# Patient Record
Sex: MALE | Race: WHITE | Employment: OTHER | URBAN - METROPOLITAN AREA
[De-identification: names, ages, dates, MRNs, and addresses within clinical notes are randomized per-mention and may not be internally consistent; named-entity substitution may affect disease eponyms.]

---

## 2017-06-20 PROBLEM — N52.1 ERECTILE DYSFUNCTION DUE TO DISEASES CLASSIFIED ELSEWHERE: Status: ACTIVE | Noted: 2017-06-20

## 2017-06-20 PROBLEM — N32.89 BLADDER MASS: Status: ACTIVE | Noted: 2017-06-20

## 2017-06-20 PROCEDURE — 88108 CYTOPATH CONCENTRATE TECH: CPT | Performed by: UROLOGY

## 2017-06-21 PROBLEM — I48.4 ATYPICAL ATRIAL FLUTTER (HCC): Status: ACTIVE | Noted: 2017-06-21

## 2017-07-12 PROBLEM — I25.10 CORONARY ARTERY DISEASE INVOLVING NATIVE CORONARY ARTERY OF NATIVE HEART WITHOUT ANGINA PECTORIS: Status: ACTIVE | Noted: 2017-07-12

## 2017-07-12 PROBLEM — Z95.1 HX OF CABG: Status: ACTIVE | Noted: 2017-07-12

## 2017-08-18 PROBLEM — I42.9 CARDIOMYOPATHY, UNSPECIFIED TYPE (HCC): Status: ACTIVE | Noted: 2017-08-18

## 2017-09-11 PROCEDURE — 88108 CYTOPATH CONCENTRATE TECH: CPT | Performed by: PHYSICIAN ASSISTANT

## 2017-09-11 PROCEDURE — 84154 ASSAY OF PSA FREE: CPT | Performed by: UROLOGY

## 2017-09-11 PROCEDURE — 84153 ASSAY OF PSA TOTAL: CPT | Performed by: UROLOGY

## 2017-09-11 PROCEDURE — 87086 URINE CULTURE/COLONY COUNT: CPT | Performed by: PHYSICIAN ASSISTANT

## 2018-05-22 PROBLEM — Z98.890 HISTORY OF CARDIOVERSION: Status: ACTIVE | Noted: 2018-05-22

## 2018-05-22 PROBLEM — Z92.89 HISTORY OF CARDIOVERSION: Status: ACTIVE | Noted: 2018-05-22

## 2018-05-22 PROBLEM — Z79.01 ANTICOAGULATED ON COUMADIN: Status: ACTIVE | Noted: 2018-05-22

## 2018-05-22 PROBLEM — I10 ESSENTIAL HYPERTENSION: Status: ACTIVE | Noted: 2018-05-22

## 2018-06-14 PROBLEM — Z95.0 PACEMAKER: Status: ACTIVE | Noted: 2018-06-14

## 2018-10-12 PROBLEM — Z86.79 H/O CARDIOMYOPATHY: Status: ACTIVE | Noted: 2017-01-12

## 2018-10-12 PROBLEM — I42.9 CARDIOMYOPATHY, UNSPECIFIED TYPE (HCC): Status: RESOLVED | Noted: 2017-08-18 | Resolved: 2018-10-12

## 2018-12-04 PROBLEM — I48.0 PAROXYSMAL ATRIAL FIBRILLATION (HCC): Chronic | Status: ACTIVE | Noted: 2018-12-04

## 2018-12-11 ENCOUNTER — HOSPITAL ENCOUNTER (OUTPATIENT)
Dept: CT IMAGING | Facility: HOSPITAL | Age: 83
Discharge: HOME | End: 2018-12-11
Attending: NURSE PRACTITIONER
Payer: MEDICARE

## 2018-12-11 VITALS
SYSTOLIC BLOOD PRESSURE: 148 MMHG | RESPIRATION RATE: 18 BRPM | TEMPERATURE: 97 F | OXYGEN SATURATION: 99 % | HEART RATE: 62 BPM | DIASTOLIC BLOOD PRESSURE: 66 MMHG

## 2018-12-11 DIAGNOSIS — I48.0 PAROXYSMAL ATRIAL FIBRILLATION (HCC): Chronic | ICD-10-CM

## 2018-12-11 PROCEDURE — 75572 CT HRT W/3D IMAGE: CPT | Performed by: NURSE PRACTITIONER

## 2018-12-11 PROCEDURE — 82565 ASSAY OF CREATININE: CPT

## 2018-12-11 PROCEDURE — 96360 HYDRATION IV INFUSION INIT: CPT | Performed by: NURSE PRACTITIONER

## 2018-12-11 RX ORDER — SODIUM CHLORIDE 9 MG/ML
INJECTION, SOLUTION INTRAVENOUS CONTINUOUS
Status: DISCONTINUED | OUTPATIENT
Start: 2018-12-11 | End: 2018-12-13

## 2018-12-11 RX ADMIN — SODIUM CHLORIDE: 9 INJECTION, SOLUTION INTRAVENOUS at 07:15:00

## 2018-12-14 NOTE — PROGRESS NOTES
Dear  Mr. Lopez Comment,    This test was done to help plan your Watchman procedure. There are no abnormalities. Please feel free to call or email for any concerns or questions.     Thank you  Melonie Dubin APN, CNP  [phone]

## 2018-12-14 NOTE — PROGRESS NOTES
Dear  Mr. Shwetha Ibanez,    This test was done to help plan your Watchman procedure. There are no abnormalities. Please feel free to call or email for any concerns or questions.     Thank you  Amanda CHENG, CNP  [phone]

## 2019-01-02 ENCOUNTER — HOSPITAL (OUTPATIENT)
Dept: OTHER | Age: 84
End: 2019-01-02
Attending: INTERNAL MEDICINE

## 2019-01-02 LAB
ANALYZER ANC (IANC): ABNORMAL
ANION GAP SERPL CALC-SCNC: 14 MMOL/L (ref 10–20)
BUN SERPL-MCNC: 18 MG/DL (ref 6–20)
BUN/CREAT SERPL: 17 (ref 7–25)
CALCIUM SERPL-MCNC: 9.5 MG/DL (ref 8.4–10.2)
CHLORIDE: 103 MMOL/L (ref 98–107)
CO2 SERPL-SCNC: 29 MMOL/L (ref 21–32)
CREAT SERPL-MCNC: 1.05 MG/DL (ref 0.67–1.17)
ERYTHROCYTE [DISTWIDTH] IN BLOOD: 13.4 % (ref 11–15)
GLUCOSE SERPL-MCNC: 109 MG/DL (ref 65–99)
HEMATOCRIT: 39.6 % (ref 39–51)
HGB BLD-MCNC: 13.3 GM/DL (ref 13–17)
MCH RBC QN AUTO: 32.9 PG (ref 26–34)
MCHC RBC AUTO-ENTMCNC: 33.6 GM/DL (ref 32–36.5)
MCV RBC AUTO: 98 FL (ref 78–100)
NRBC (NRBCRE): 0 /100 WBC
PLATELET # BLD: 125 THOUSAND/MCL (ref 140–450)
POTASSIUM SERPL-SCNC: 4.7 MMOL/L (ref 3.4–5.1)
RBC # BLD: 4.04 MILLION/MCL (ref 4.5–5.9)
SODIUM SERPL-SCNC: 141 MMOL/L (ref 135–145)
WBC # BLD: 3.3 THOUSAND/MCL (ref 4.2–11)

## 2019-01-09 ENCOUNTER — HOSPITAL (OUTPATIENT)
Dept: OTHER | Age: 84
End: 2019-01-09
Attending: HOSPITALIST

## 2019-01-09 LAB
GLUCOSE BLDC GLUCOMTR-MCNC: 102 MG/DL (ref 65–99)
INR PPP: 1.3
PROTHROMBIN TIME: 12.7 SECONDS (ref 9.7–11.8)
PROTHROMBIN TIME: ABNORMAL

## 2019-01-10 LAB
ANALYZER ANC (IANC): ABNORMAL
ANION GAP SERPL CALC-SCNC: 11 MMOL/L (ref 10–20)
BUN SERPL-MCNC: 19 MG/DL (ref 6–20)
BUN/CREAT SERPL: 16 (ref 7–25)
CALCIUM SERPL-MCNC: 8.6 MG/DL (ref 8.4–10.2)
CHLORIDE: 104 MMOL/L (ref 98–107)
CO2 SERPL-SCNC: 29 MMOL/L (ref 21–32)
CREAT SERPL-MCNC: 1.16 MG/DL (ref 0.67–1.17)
ERYTHROCYTE [DISTWIDTH] IN BLOOD: 13.4 % (ref 11–15)
GLUCOSE SERPL-MCNC: 104 MG/DL (ref 65–99)
HEMATOCRIT: 36 % (ref 39–51)
HGB BLD-MCNC: 12.1 GM/DL (ref 13–17)
INR PPP: 1.1
MCH RBC QN AUTO: 33 PG (ref 26–34)
MCHC RBC AUTO-ENTMCNC: 33.6 GM/DL (ref 32–36.5)
MCV RBC AUTO: 98.1 FL (ref 78–100)
NRBC (NRBCRE): 0 /100 WBC
PLATELET # BLD: 111 THOUSAND/MCL (ref 140–450)
POTASSIUM SERPL-SCNC: 4.4 MMOL/L (ref 3.4–5.1)
PROTHROMBIN TIME: 11.6 SECONDS (ref 9.7–11.8)
PROTHROMBIN TIME: NORMAL
RBC # BLD: 3.67 MILLION/MCL (ref 4.5–5.9)
SODIUM SERPL-SCNC: 140 MMOL/L (ref 135–145)
WBC # BLD: 5.1 THOUSAND/MCL (ref 4.2–11)

## 2019-01-16 PROBLEM — I48.4 ATYPICAL ATRIAL FLUTTER (HCC): Status: RESOLVED | Noted: 2017-06-21 | Resolved: 2019-01-14

## 2019-01-16 PROCEDURE — 88108 CYTOPATH CONCENTRATE TECH: CPT | Performed by: PHYSICIAN ASSISTANT

## 2019-01-29 PROBLEM — Z53.09 MEDICAL CONTRAINDICATION TO ANTICOAGULANT MEDICATION: Status: ACTIVE | Noted: 2019-01-29

## 2019-01-29 PROBLEM — Z95.9 CARDIAC DEVICE IN SITU: Status: ACTIVE | Noted: 2019-01-09

## 2019-02-27 ENCOUNTER — HOSPITAL (OUTPATIENT)
Dept: OTHER | Age: 84
End: 2019-02-27
Attending: INTERNAL MEDICINE

## 2019-02-27 LAB
ANALYZER ANC (IANC): ABNORMAL
ANION GAP SERPL CALC-SCNC: 12 MMOL/L (ref 10–20)
BASOPHILS # BLD: 0 THOUSAND/MCL (ref 0–0.3)
BASOPHILS NFR BLD: 0 %
BUN SERPL-MCNC: 17 MG/DL (ref 6–20)
BUN/CREAT SERPL: 18 (ref 7–25)
CALCIUM SERPL-MCNC: 8.4 MG/DL (ref 8.4–10.2)
CHLORIDE: 106 MMOL/L (ref 98–107)
CO2 SERPL-SCNC: 26 MMOL/L (ref 21–32)
CREAT SERPL-MCNC: 0.93 MG/DL (ref 0.67–1.17)
DIFFERENTIAL METHOD BLD: ABNORMAL
EOSINOPHIL # BLD: 0.1 THOUSAND/MCL (ref 0.1–0.5)
EOSINOPHIL NFR BLD: 3 %
ERYTHROCYTE [DISTWIDTH] IN BLOOD: 13.4 % (ref 11–15)
GLUCOSE SERPL-MCNC: 104 MG/DL (ref 65–99)
HEMATOCRIT: 35 % (ref 39–51)
HGB BLD-MCNC: 12 GM/DL (ref 13–17)
IMM GRANULOCYTES # BLD AUTO: 0 THOUSAND/MCL (ref 0–0.2)
IMM GRANULOCYTES NFR BLD: 0 %
LYMPHOCYTES # BLD: 1.1 THOUSAND/MCL (ref 1–4)
LYMPHOCYTES NFR BLD: 34 %
MCH RBC QN AUTO: 33.4 PG (ref 26–34)
MCHC RBC AUTO-ENTMCNC: 34.3 GM/DL (ref 32–36.5)
MCV RBC AUTO: 97.5 FL (ref 78–100)
MONOCYTES # BLD: 0.4 THOUSAND/MCL (ref 0.3–0.9)
MONOCYTES NFR BLD: 13 %
NEUTROPHILS # BLD: 1.5 THOUSAND/MCL (ref 1.8–7.7)
NEUTROPHILS NFR BLD: 50 %
NEUTS SEG NFR BLD: ABNORMAL %
NRBC (NRBCRE): 0 /100 WBC
PLATELET # BLD: 125 THOUSAND/MCL (ref 140–450)
POTASSIUM SERPL-SCNC: 4 MMOL/L (ref 3.4–5.1)
RBC # BLD: 3.59 MILLION/MCL (ref 4.5–5.9)
SODIUM SERPL-SCNC: 140 MMOL/L (ref 135–145)
WBC # BLD: 3.1 THOUSAND/MCL (ref 4.2–11)

## 2019-02-28 PROBLEM — I48.0 PAROXYSMAL ATRIAL FIBRILLATION (HCC): Chronic | Status: RESOLVED | Noted: 2018-12-04 | Resolved: 2019-02-28

## 2019-11-13 PROBLEM — Z79.01 ANTICOAGULATED ON COUMADIN: Status: RESOLVED | Noted: 2018-05-22 | Resolved: 2019-11-13

## 2019-11-13 PROBLEM — Z92.89 HISTORY OF CARDIOVERSION: Status: RESOLVED | Noted: 2018-05-22 | Resolved: 2019-11-13

## 2019-11-13 PROBLEM — Z95.1 HX OF CABG: Status: RESOLVED | Noted: 2017-07-12 | Resolved: 2019-11-13

## 2019-11-13 PROBLEM — Z98.890 HISTORY OF CARDIOVERSION: Status: RESOLVED | Noted: 2018-05-22 | Resolved: 2019-11-13

## 2019-11-14 PROBLEM — N32.89 BLADDER MASS: Status: RESOLVED | Noted: 2017-06-20 | Resolved: 2019-11-14

## 2019-11-14 PROBLEM — D69.6 THROMBOCYTOPENIA (HCC): Status: ACTIVE | Noted: 2019-11-14

## 2019-11-14 PROBLEM — D69.6 THROMBOCYTOPENIA: Status: ACTIVE | Noted: 2019-11-14

## 2019-11-14 PROBLEM — R73.01 ELEVATED FASTING BLOOD SUGAR: Status: ACTIVE | Noted: 2019-11-14

## 2020-02-13 PROBLEM — Z87.438 HISTORY OF BPH: Status: ACTIVE | Noted: 2020-02-13

## 2020-02-13 PROBLEM — I48.92 ATRIAL FLUTTER (CMD): Status: ACTIVE | Noted: 2020-02-13

## 2020-02-13 PROBLEM — I25.10 CORONARY ARTERY DISEASE INVOLVING NATIVE CORONARY ARTERY OF NATIVE HEART: Status: ACTIVE | Noted: 2020-02-13

## 2020-02-13 PROBLEM — I48.91 A-FIB (CMD): Status: ACTIVE | Noted: 2020-02-13

## 2020-02-13 PROBLEM — Z95.1 HX OF CABG: Status: ACTIVE | Noted: 2020-02-13

## 2020-02-13 PROBLEM — Z95.0 PACEMAKER: Status: ACTIVE | Noted: 2020-02-13

## 2020-02-13 PROBLEM — I10 HTN (HYPERTENSION): Status: ACTIVE | Noted: 2020-02-13

## 2020-02-13 PROBLEM — Z91.89 RISK FACTORS FOR OBSTRUCTIVE SLEEP APNEA: Status: ACTIVE | Noted: 2020-02-13

## 2020-02-13 RX ORDER — AMIODARONE HYDROCHLORIDE 100 MG/1
100 TABLET ORAL DAILY
COMMUNITY

## 2020-02-13 RX ORDER — FINASTERIDE 5 MG/1
5 TABLET, FILM COATED ORAL EVERY EVENING
COMMUNITY

## 2020-02-13 RX ORDER — TETRACYCLINE HCL 500 MG
1 CAPSULE ORAL DAILY
COMMUNITY

## 2020-02-13 RX ORDER — CARVEDILOL 6.25 MG/1
6.25 TABLET ORAL 2 TIMES DAILY WITH MEALS
COMMUNITY

## 2020-02-13 RX ORDER — TAMSULOSIN HYDROCHLORIDE 0.4 MG/1
0.4 CAPSULE ORAL EVERY EVENING
COMMUNITY

## 2020-02-13 RX ORDER — OLMESARTAN MEDOXOMIL 40 MG/1
40 TABLET ORAL DAILY
COMMUNITY

## 2020-02-14 ENCOUNTER — HOSPITAL ENCOUNTER (OUTPATIENT)
Dept: CARDIOLOGY | Age: 85
Discharge: HOME | End: 2020-02-14
Attending: INTERNAL MEDICINE

## 2020-02-14 VITALS
BODY MASS INDEX: 30.2 KG/M2 | DIASTOLIC BLOOD PRESSURE: 88 MMHG | OXYGEN SATURATION: 92 % | WEIGHT: 210.98 LBS | TEMPERATURE: 96.8 F | HEIGHT: 70 IN | RESPIRATION RATE: 13 BRPM | SYSTOLIC BLOOD PRESSURE: 170 MMHG | HEART RATE: 62 BPM

## 2020-02-14 DIAGNOSIS — I48.0 PAF (PAROXYSMAL ATRIAL FIBRILLATION) (CMD): ICD-10-CM

## 2020-02-14 PROCEDURE — 10002807 HB RX 258

## 2020-02-14 PROCEDURE — 13000001 HB PHASE II RECOVERY EA 30 MINUTES

## 2020-02-14 PROCEDURE — 99152 MOD SED SAME PHYS/QHP 5/>YRS: CPT

## 2020-02-14 PROCEDURE — 93325 DOPPLER ECHO COLOR FLOW MAPG: CPT

## 2020-02-14 PROCEDURE — 10002800 HB RX 250 W HCPCS: Performed by: INTERNAL MEDICINE

## 2020-02-14 RX ORDER — MIDAZOLAM HYDROCHLORIDE 2 MG/2ML
INJECTION, SOLUTION INTRAMUSCULAR; INTRAVENOUS
Status: DISPENSED
Start: 2020-02-14 | End: 2020-02-14

## 2020-02-14 RX ORDER — MIDAZOLAM HYDROCHLORIDE 1 MG/ML
INJECTION, SOLUTION INTRAMUSCULAR; INTRAVENOUS PRN
Status: COMPLETED | OUTPATIENT
Start: 2020-02-14 | End: 2020-02-14

## 2020-02-14 RX ORDER — NALOXONE HCL 0.4 MG/ML
VIAL (ML) INJECTION
Status: DISCONTINUED
Start: 2020-02-14 | End: 2020-02-14 | Stop reason: WASHOUT

## 2020-02-14 RX ORDER — SODIUM CHLORIDE 9 MG/ML
INJECTION, SOLUTION INTRAVENOUS
Status: COMPLETED
Start: 2020-02-14 | End: 2020-02-14

## 2020-02-14 RX ORDER — SODIUM CHLORIDE 9 MG/ML
INJECTION, SOLUTION INTRAVENOUS CONTINUOUS
Status: DISCONTINUED | OUTPATIENT
Start: 2020-02-14 | End: 2020-02-15 | Stop reason: HOSPADM

## 2020-02-14 RX ORDER — FLUMAZENIL 0.1 MG/ML
INJECTION, SOLUTION INTRAVENOUS
Status: DISCONTINUED
Start: 2020-02-14 | End: 2020-02-14 | Stop reason: WASHOUT

## 2020-02-14 RX ADMIN — MIDAZOLAM HYDROCHLORIDE 2 MG: 1 INJECTION, SOLUTION INTRAMUSCULAR; INTRAVENOUS at 08:44

## 2020-02-14 RX ADMIN — SODIUM CHLORIDE: 9 INJECTION, SOLUTION INTRAVENOUS at 08:34

## 2020-02-14 RX ADMIN — SODIUM CHLORIDE: 0.9 INJECTION, SOLUTION INTRAVENOUS at 08:34

## 2020-02-14 RX ADMIN — MIDAZOLAM HYDROCHLORIDE 2 MG: 1 INJECTION, SOLUTION INTRAMUSCULAR; INTRAVENOUS at 08:49

## 2020-02-14 RX ADMIN — FENTANYL CITRATE 50 MCG: 50 INJECTION INTRAMUSCULAR; INTRAVENOUS at 08:44

## 2020-02-14 RX ADMIN — MIDAZOLAM HYDROCHLORIDE 2 MG: 1 INJECTION, SOLUTION INTRAMUSCULAR; INTRAVENOUS at 08:46

## 2020-02-14 ASSESSMENT — PAIN SCALES - GENERAL
PAINLEVEL_OUTOF10: 0
PAINLEVEL_OUTOF10: 0

## 2022-03-17 ENCOUNTER — OFFICE VISIT (OUTPATIENT)
Dept: SURGERY | Facility: CLINIC | Age: 87
End: 2022-03-17
Payer: MEDICARE

## 2022-03-17 DIAGNOSIS — R82.90 URINE FINDING: ICD-10-CM

## 2022-03-17 DIAGNOSIS — N13.8 BPH WITH OBSTRUCTION/LOWER URINARY TRACT SYMPTOMS: Primary | ICD-10-CM

## 2022-03-17 DIAGNOSIS — N40.1 BPH WITH OBSTRUCTION/LOWER URINARY TRACT SYMPTOMS: Primary | ICD-10-CM

## 2022-03-17 LAB
APPEARANCE: CLEAR
BILIRUBIN: NEGATIVE
GLUCOSE (URINE DIPSTICK): NEGATIVE MG/DL
KETONES (URINE DIPSTICK): NEGATIVE MG/DL
MULTISTIX LOT#: ABNORMAL NUMERIC
NITRITE, URINE: NEGATIVE
PH, URINE: 7 (ref 4.5–8)
PROTEIN (URINE DIPSTICK): 30 MG/DL
SPECIFIC GRAVITY: 1.02 (ref 1–1.03)
URINE-COLOR: YELLOW
UROBILINOGEN,SEMI-QN: 0.2 MG/DL (ref 0–1.9)

## 2022-03-17 PROCEDURE — 81003 URINALYSIS AUTO W/O SCOPE: CPT | Performed by: PHYSICIAN ASSISTANT

## 2022-03-17 PROCEDURE — 99202 OFFICE O/P NEW SF 15 MIN: CPT | Performed by: PHYSICIAN ASSISTANT

## 2022-03-17 RX ORDER — FINASTERIDE 5 MG/1
5 TABLET, FILM COATED ORAL DAILY
Qty: 90 TABLET | Refills: 3 | Status: SHIPPED | OUTPATIENT
Start: 2022-03-17

## 2022-03-17 RX ORDER — TAMSULOSIN HYDROCHLORIDE 0.4 MG/1
0.4 CAPSULE ORAL DAILY
Qty: 30 CAPSULE | Refills: 0 | Status: SHIPPED | OUTPATIENT
Start: 2022-03-17 | End: 2022-04-16

## 2022-03-18 RX ORDER — TAMSULOSIN HYDROCHLORIDE 0.4 MG/1
CAPSULE ORAL
Qty: 90 CAPSULE | Refills: 3 | Status: SHIPPED | OUTPATIENT
Start: 2022-03-18

## 2023-04-03 ENCOUNTER — TELEPHONE (OUTPATIENT)
Dept: SURGERY | Facility: CLINIC | Age: 88
End: 2023-04-03

## 2023-04-05 RX ORDER — FINASTERIDE 5 MG/1
TABLET, FILM COATED ORAL
Qty: 90 TABLET | Refills: 3 | OUTPATIENT
Start: 2023-04-05

## 2023-04-05 NOTE — TELEPHONE ENCOUNTER
Refill request for Finasteride. LOV greater than 1 year ago on 3/17/2022 and no future appt made.  Refill will be denied at this time and [facility] sent

## 2023-04-05 NOTE — TELEPHONE ENCOUNTER
Refill request for Finasteride.  LOV greater than 1 year ago on 3/17/2022 and no future appts made, refill request will be denied at this time and [facility] sent

## 2023-04-10 DIAGNOSIS — N13.8 BPH WITH OBSTRUCTION/LOWER URINARY TRACT SYMPTOMS: ICD-10-CM

## 2023-04-10 DIAGNOSIS — N40.1 BPH WITH OBSTRUCTION/LOWER URINARY TRACT SYMPTOMS: ICD-10-CM

## 2023-04-10 RX ORDER — TAMSULOSIN HYDROCHLORIDE 0.4 MG/1
CAPSULE ORAL
Qty: 90 CAPSULE | Refills: 3 | OUTPATIENT
Start: 2023-04-10

## 2023-04-10 NOTE — TELEPHONE ENCOUNTER
Last OV was on 3/17/2022. Greater than one year and no future appointments scheduled. Medication refill request denied. [employer] message sent to patient.

## 2023-04-14 ENCOUNTER — TELEPHONE (OUTPATIENT)
Dept: SURGERY | Facility: CLINIC | Age: 88
End: 2023-04-14

## 2023-04-14 NOTE — TELEPHONE ENCOUNTER
Pharmacy calling to inform that the patient is at the pharmacy and wants to know why the refill for Tamsulosin was denied.

## 2023-04-19 NOTE — TELEPHONE ENCOUNTER
RN replied to Daniel Amin regarding an inquiry about the denial of Finasteride. Patient was last seen on 3/17/22. No future appt  RN reached out to patient. No answer. Left message as well. Needs to set up yearly appt.

## 2023-04-26 RX ORDER — FINASTERIDE 5 MG/1
TABLET, FILM COATED ORAL
Qty: 30 TABLET | Refills: 0 | Status: SHIPPED | OUTPATIENT
Start: 2023-04-26 | End: 2023-05-26

## 2023-04-26 NOTE — TELEPHONE ENCOUNTER
Pt's last OV was 3/17/22. Pt scheduled a follow up for 5/10/23. Refill sent for 30 days. No additition refills.

## 2023-04-27 DIAGNOSIS — N40.1 BPH WITH OBSTRUCTION/LOWER URINARY TRACT SYMPTOMS: ICD-10-CM

## 2023-04-27 DIAGNOSIS — N13.8 BPH WITH OBSTRUCTION/LOWER URINARY TRACT SYMPTOMS: ICD-10-CM

## 2023-04-28 RX ORDER — TAMSULOSIN HYDROCHLORIDE 0.4 MG/1
CAPSULE ORAL
Qty: 30 CAPSULE | Refills: 0 | Status: SHIPPED | OUTPATIENT
Start: 2023-04-28

## 2023-05-10 ENCOUNTER — OFFICE VISIT (OUTPATIENT)
Dept: SURGERY | Facility: CLINIC | Age: 88
End: 2023-05-10

## 2023-05-10 DIAGNOSIS — R82.90 ABNORMAL URINALYSIS: ICD-10-CM

## 2023-05-10 DIAGNOSIS — R82.90 URINE FINDING: ICD-10-CM

## 2023-05-10 DIAGNOSIS — N40.1 BPH WITH OBSTRUCTION/LOWER URINARY TRACT SYMPTOMS: Primary | ICD-10-CM

## 2023-05-10 DIAGNOSIS — N13.8 BPH WITH OBSTRUCTION/LOWER URINARY TRACT SYMPTOMS: Primary | ICD-10-CM

## 2023-05-10 LAB
APPEARANCE: CLEAR
BILIRUBIN: NEGATIVE
GLUCOSE (URINE DIPSTICK): NEGATIVE MG/DL
HYALINE CASTS #/AREA URNS AUTO: PRESENT /LPF
KETONES (URINE DIPSTICK): NEGATIVE MG/DL
LEUKOCYTES: NEGATIVE
MULTISTIX LOT#: ABNORMAL NUMERIC
NITRITE, URINE: NEGATIVE
PH, URINE: 5.5 (ref 4.5–8)
PROTEIN (URINE DIPSTICK): 100 MG/DL
RBC #/AREA URNS AUTO: >10 /HPF
SPECIFIC GRAVITY: 1.02 (ref 1–1.03)
UROBILINOGEN,SEMI-QN: 0.2 MG/DL (ref 0–1.9)

## 2023-05-10 PROCEDURE — 99213 OFFICE O/P EST LOW 20 MIN: CPT | Performed by: PHYSICIAN ASSISTANT

## 2023-05-10 PROCEDURE — 81003 URINALYSIS AUTO W/O SCOPE: CPT | Performed by: PHYSICIAN ASSISTANT

## 2023-05-10 RX ORDER — FINASTERIDE 5 MG/1
5 TABLET, FILM COATED ORAL DAILY
Qty: 90 TABLET | Refills: 3 | Status: SHIPPED | OUTPATIENT
Start: 2023-05-10 | End: 2023-06-09

## 2023-05-10 RX ORDER — TAMSULOSIN HYDROCHLORIDE 0.4 MG/1
0.4 CAPSULE ORAL DAILY
Qty: 90 CAPSULE | Refills: 3 | Status: SHIPPED | OUTPATIENT
Start: 2023-05-10

## 2023-05-11 DIAGNOSIS — R31.29 MICROHEMATURIA: Primary | ICD-10-CM

## 2024-05-12 DIAGNOSIS — N13.8 BPH WITH OBSTRUCTION/LOWER URINARY TRACT SYMPTOMS: ICD-10-CM

## 2024-05-12 DIAGNOSIS — N40.1 BPH WITH OBSTRUCTION/LOWER URINARY TRACT SYMPTOMS: ICD-10-CM

## 2024-05-14 ENCOUNTER — TELEPHONE (OUTPATIENT)
Dept: SURGERY | Facility: CLINIC | Age: 89
End: 2024-05-14

## 2024-05-14 DIAGNOSIS — N40.1 BPH WITH OBSTRUCTION/LOWER URINARY TRACT SYMPTOMS: ICD-10-CM

## 2024-05-14 DIAGNOSIS — N13.8 BPH WITH OBSTRUCTION/LOWER URINARY TRACT SYMPTOMS: ICD-10-CM

## 2024-05-14 RX ORDER — TAMSULOSIN HYDROCHLORIDE 0.4 MG/1
0.4 CAPSULE ORAL DAILY
Qty: 90 CAPSULE | Refills: 0 | Status: SHIPPED | OUTPATIENT
Start: 2024-05-14

## 2024-05-14 RX ORDER — TAMSULOSIN HYDROCHLORIDE 0.4 MG/1
0.4 CAPSULE ORAL DAILY
Qty: 90 CAPSULE | Refills: 3 | OUTPATIENT
Start: 2024-05-14

## 2024-05-16 RX ORDER — FINASTERIDE 5 MG/1
5 TABLET, FILM COATED ORAL DAILY
Qty: 90 TABLET | Refills: 3 | OUTPATIENT
Start: 2024-05-16

## 2024-05-22 ENCOUNTER — TELEPHONE (OUTPATIENT)
Dept: SURGERY | Facility: CLINIC | Age: 89
End: 2024-05-22

## 2024-05-22 RX ORDER — FINASTERIDE 5 MG/1
5 TABLET, FILM COATED ORAL DAILY
Qty: 90 TABLET | Refills: 0 | Status: SHIPPED | OUTPATIENT
Start: 2024-05-22

## 2024-05-22 NOTE — TELEPHONE ENCOUNTER
This encounter is now closed.     Refill request of Finasteride approved. LOV May 2023  Follow up appt on 6/4    Benign Prostatic Hypertrophy Medications      Protocol Criteria:  Appointment scheduled in the past 12 months or in the next 2 months  If patients is between the ages of 50 and 70 then PSA done within the last 2 years and is either  Less than or equal to 4.0 ng/ml  Or if greater than 4.0 there is no significant increase (>0.5 ng/ml) in PSA over the last 2 years    Recent Outpatient Visits              1 year ago BPH with obstruction/lower urinary tract symptoms    [facility] Melissa Boss PA-C    Office Visit    2 years ago [facility] with obstruction/lower urinary tract [facility]    [facility] Visit    2 years ago Pacemaker [employer]    [facility]    Office Visit    2 years ago Pacemaker    Cardiology - [address]    Nurse Only    2 years ago Atypical atrial flutter (HCC)    [facility] Al Bustos [facility]    Office Visit          Future Appointments         Provider Department Appt Notes    In 1 week Melissa Boss PA-C [facility] [place]                 PSA:    Lab Results   Component Value Date    PSA 3.220 09/11/2017       PSA Screen:  No results found for: \"PSAS\"

## 2024-05-22 NOTE — TELEPHONE ENCOUNTER
Patient daughter calling requesting medication refill for   FINASTERIDE 5 MG Oral Tab  Patient is schedule for a follow up 6/4

## 2024-07-15 ENCOUNTER — OFFICE VISIT (OUTPATIENT)
Dept: SURGERY | Facility: CLINIC | Age: 89
End: 2024-07-15

## 2024-07-15 DIAGNOSIS — N13.8 BPH WITH OBSTRUCTION/LOWER URINARY TRACT SYMPTOMS: Primary | ICD-10-CM

## 2024-07-15 DIAGNOSIS — N40.1 BPH WITH OBSTRUCTION/LOWER URINARY TRACT SYMPTOMS: Primary | ICD-10-CM

## 2024-07-15 DIAGNOSIS — R31.29 MICROHEMATURIA: ICD-10-CM

## 2024-07-15 DIAGNOSIS — Z87.891 FORMER SMOKER: ICD-10-CM

## 2024-07-15 DIAGNOSIS — R82.90 URINE FINDING: ICD-10-CM

## 2024-07-15 LAB
APPEARANCE: CLEAR
BILIRUBIN: NEGATIVE
GLUCOSE (URINE DIPSTICK): NEGATIVE MG/DL
KETONES (URINE DIPSTICK): NEGATIVE MG/DL
MULTISTIX LOT#: ABNORMAL NUMERIC
NITRITE, URINE: NEGATIVE
PH, URINE: 6 (ref 4.5–8)
PROTEIN (URINE DIPSTICK): 30 MG/DL
SPECIFIC GRAVITY: 1.02 (ref 1–1.03)
URINE-COLOR: YELLOW
UROBILINOGEN,SEMI-QN: 0.2 MG/DL (ref 0–1.9)

## 2024-07-15 RX ORDER — FINASTERIDE 5 MG/1
5 TABLET, FILM COATED ORAL DAILY
Qty: 90 TABLET | Refills: 3 | Status: SHIPPED | OUTPATIENT
Start: 2024-07-15

## 2024-07-15 RX ORDER — TAMSULOSIN HYDROCHLORIDE 0.4 MG/1
0.4 CAPSULE ORAL DAILY
Qty: 90 CAPSULE | Refills: 3 | Status: SHIPPED | OUTPATIENT
Start: 2024-07-15

## 2024-07-15 RX ORDER — FINASTERIDE 5 MG/1
5 TABLET, FILM COATED ORAL DAILY
Qty: 90 TABLET | Refills: 3 | Status: SHIPPED | OUTPATIENT
Start: 2024-07-15 | End: 2024-07-15

## 2024-07-15 NOTE — PATIENT INSTRUCTIONS
Call to schedule ultrasound.  Will send urine for testing and notify you of the results.  Return for Cystoscopy - may be done at annual visit.     Cystoscopy is a test that allows your doctor to look at the inside of the bladder and the urethra using a thin, lighted instrument called a cystoscope.  The cystoscope is inserted into your urethra and slowly advanced into the bladder. Cystoscopy allows your doctor to look at areas of your bladder and urethra that usually do not show up well on X-rays. Tiny surgical instruments can be inserted through the cystoscope that allow your doctor to remove samples of tissue (biopsy) or samples of urine.  Small bladder stones and some small growths can be removed during cystoscopy. This may eliminate the need for more extensive surgery.     Why It Is Done  Cystoscopy may be done to:  Find the cause of symptoms such as blood in the urine (hematuria), painful urination (dysuria), urinary incontinence, urinary frequency or hesitancy, an inability to pass urine (retention), or a sudden and overwhelming need to urinate (urgency).   Find the cause of problems of the urinary tract, such as frequent, repeated urinary tract infections or urinary tract infections that do not respond to treatment.   Look for problems in the urinary tract, such as blockage in the urethra caused by an enlarged prostate, kidney stones, or tumors.   Evaluate problems that cannot be seen on X-ray or to further investigate problems detected by ultrasound or during intravenous pyelography, such as kidney stones or tumors.   Remove tissue samples for biopsy.   Remove foreign objects.   Treat urinary tract problems. For example, cystoscopy can be done to remove urinary tract stones or growths, treat bleeding in the bladder, relieve blockages in the urethra, or treat or remove tumors.   How To Prepare  You may eat and drink normally before the procedure. You may be asked to give a urine sample at the time of your  procedure. Please do not urinate before.    How It Is Done  Cystoscopy is performed by a urologist, with one or more assistants. The test is done in a special testing room in the doctor's office.  You will need undress from the waste down, and you will be given a cloth or paper covering to use during the test. You will lie on your back on a special table. Females will have their knees bent and feet placed in stirrups. Males will lay flat on the table, legs straight. Your genital area is cleaned with an antiseptic solution, and your abdomen and thighs are covered with sterile cloths. A local anesthetic jelly will be inserted into the urethra. No needles are used.   After the anesthetic takes effect, a well-lubricated cystoscope is inserted into your urethra and slowly advanced into your bladder. If your urethra has a spot that is too narrow to allow the scope to pass, other smaller instruments are inserted first to gradually enlarge the opening.  After the cystoscope is inside your bladder, either sterile water or saline is injected through the scope to help expand your bladder and to create a clear view. Tiny instruments may be inserted through the scope to collect tissue samples for biopsy if necessary; the tissue samples then are sent to the laboratory for analysis.  The cystoscope is usually in your bladder for only 2 to 10 minutes. But the entire test may take up to 30 minutes or longer.  You will be able to get up immediately following the procedure and proceed with normal daily activities.     How It Feels  Most people report that this test is not nearly as uncomfortable as they had expected.   When local anesthetic is used, you may feel a burning sensation or an urge to urinate when the instrument is inserted and removed. Also, when your bladder is irrigated with sterile water or saline, you may feel a cool sensation, an uncomfortable fullness, and an urgent need to urinate. Try to relax during the test by  taking slow, deep breaths. Also, if the test is lengthy, lying on the table can become tiring and uncomfortable.     After the test  After the test, you may need to urinate frequently, with some burning during and after urination for a day or two. Drink lots of fluids to help minimize the burning and to prevent a urinary tract infection. You may also see a tinge of blood in the urine for 2-3 days.    You will be given an instruction sheet following your cystoscopy with post procedure instructions.     Results  Cystoscopy is a test that allows the doctor to look at the inside of the bladder and the urethra. Your doctor may be able to talk to you about some of the results right after the cystoscopy. If a biopsy is preformed, the results usually take several days to become available. You will be called promptly with results.   Thank you for the pleasure of allowing us to be involved in your care.

## 2024-07-15 NOTE — PROGRESS NOTES
Subjective:   89 yo M with hx of Afib, CAD, BPH with LUTs, gross hematuria s/p work up with cystoscopy and CT 2017 who presents today for follow up.    Patient last seen in May 2023 for annual visit. Remains on tamsulosin and finasteride daily. Renal/bladder ultrasound was ordered due to abnormal microscopic urinalysis (11-20 WBC/hpf, >10 RBC/hpf, no bacteria), but this was not completed.     Remains satisfied with voiding habits. No ASE with medications.   Stable LUTS-   Noc x 1-2, has glass of wine with dinner and then usually falls asleep - usually in bed 8-9.   DF not bothersome. Good FOS while on tamsulosin  He denies any infections. No dysuria, gross hematuria.      Patient seen in March 2022 to establish care with [facility], previously had been following by another  group and on tamsulosin/finasteride since 2019 with good control of symptoms. Prior gross hematuria 2017. Had cystoscopy and CTU negative.    Former smoker.  Brother +PCA.     UA +small blood. PVR today 26mL.  Last Scr 1.38 6/24/24 - stable    History/Other:    No Further Nursing Notes to Review         Current Outpatient Medications   Medication Sig Dispense Refill    finasteride 5 MG Oral Tab Take 1 tablet (5 mg total) by mouth daily. 90 tablet 0    tamsulosin 0.4 MG Oral Cap Take 1 capsule (0.4 mg total) by mouth daily. 90 capsule 0    OLMESARTAN MEDOXOMIL 40 MG Oral Tab TAKE ONE TABLET BY MOUTH DAILY 90 tablet 0    AMIODARONE 200 MG Oral Tab TAKE 1/2 TABLET (100 MG TOTAL) BY MOUTH DAILY 45 tablet 1    CARVEDILOL 6.25 MG Oral Tab TAKE ONE TABLET BY MOUTH TWICE A DAY WITH MEALS 180 tablet 0    aspirin 81 MG Oral Tab Take 1 tablet (81 mg total) by mouth daily.      UBIQUINOL OR Take by mouth.      TURMERIC OR Take by mouth.      Glucosamine-Chondroitin (OSTEO BI-FLEX REGULAR STRENGTH OR) Take by mouth.      Omega-3 Fatty Acids (FISH OIL OR) Take 1,200 mg by mouth.        Calcium Carbonate-Vitamin D (CALCIUM-D OR) Take by mouth.      Apple  Cider Vinegar 500 MG Oral Tab Take by mouth daily.      Multiple Vitamin (MULTI-VITAMINS) Oral Tab Take by mouth daily.      Flaxseed Oil Does not apply Oil daily.         Review of Systems:  Pertinent items are noted in HPI.      Objective:   There were no vitals taken for this visit. Estimated body mass index is 28.84 kg/m² as calculated from the following:    Height as of 11/15/21: 5' 10\" (1.778 m).    Weight as of 11/15/21: 201 lb (91.2 kg).    No distress  Non labored respirations    Laboratory Data:  Lab Results   Component Value Date    WBC 4.00 11/07/2019    HGB 14.0 11/07/2019     (L) 11/07/2019     Lab Results   Component Value Date     09/30/2021    K 5.20 (H) 09/30/2021     09/30/2021    CO2 28.1 09/30/2021    BUN 19.0 09/30/2021     (H) 09/30/2021    AST 22 11/07/2019    ALT 14 11/07/2019    TP 7.7 11/07/2019    ALB 4.1 11/07/2019    CA 9.5 09/30/2021       Urinalysis Results (last three years):  Recent Labs     03/17/22  1132 05/10/23  1113 05/10/23  1158   SPECGRAVITY 1.020 1.025  --    PHURINE 7.0 5.5  --    NITRITE Negative Negative  --    WBCUR  --   --  11-20*   RBCUR  --   --  >10*   BACUR  --   --  None Seen       Urine Culture Results (last three years):  Lab Results   Component Value Date    URINECUL No Growth 2 Days 03/17/2022    URINECUL No Growth 2 Days 09/11/2017       Imaging  No results found.    Assessment & Plan:   BPH with LUTS  Overall satisfied with voiding habits  PVR today 26mL  Will continue current regimen with tamsulosin and finasteride, scripts renewed.    Microscopic hematuria   Prior evaluation for gross hematuria in 2017  UA today small blood - send urine for microscopic urinalysis and cytology  Recommend imaging and cystoscopy, discussed indications  Will proceed with ultrasound now ,patient would like to defer cystoscopy until time of annual visit in 2025. Advised to notify office if any recurrence of gross hematuria and would need  sooner.    Reviewed above with daughter and son. Plan for cystoscopy May-June 2025.    Melissa Fitch PA-C, 7/15/2024

## 2025-06-27 ENCOUNTER — PROCEDURE (OUTPATIENT)
Dept: SURGERY | Facility: CLINIC | Age: OVER 89
End: 2025-06-27

## 2025-06-27 DIAGNOSIS — R31.29 MICROHEMATURIA: Primary | ICD-10-CM

## 2025-06-27 PROCEDURE — 52000 CYSTOURETHROSCOPY: CPT | Performed by: UROLOGY

## 2025-06-27 RX ORDER — SULFAMETHOXAZOLE AND TRIMETHOPRIM 800; 160 MG/1; MG/1
1 TABLET ORAL ONCE
Status: COMPLETED | OUTPATIENT
Start: 2025-06-27 | End: 2025-06-27

## 2025-06-27 RX ADMIN — SULFAMETHOXAZOLE AND TRIMETHOPRIM 1 TABLET: 800; 160 TABLET ORAL at 11:06:00

## 2025-06-27 NOTE — PROGRESS NOTES
Clinic Procedure Note    INDICATIONS:   Jluis Garcia is a 89 year old male with history of BPH, LUTS, CAD, A-fib presenting for cystoscopy to complete workup.    Patient previously followed with outside hospital group.  Had gross hematuria during in 2017 with a negative workup.  He has been on tamsulosin and finasteride since 2019 per his outside hospital urology group with good control of his LUTS.  He then transferred care to us in March 2022.  He has been having annual visits with us since that time.  He saw MATTEO in July 20, 2024.  At that time he reportedly was a former smoker.  His UA at that time had small blood.  PVR was 26.  He was overall satisfied with his urinary symptoms and his medications were refilled.  His last evaluation for hematuria was in 2017, given his UA findings (previous UA with micro in 2023 had been positive but not worked up) he was offered cystoscopy.  He apparently wanted to defer until 2025.  He now presents for evaluation.  A ultrasound of his kidneys was also ordered but not completed.  His last imaging was in 2017 and again was normal.  I again discussed with patient and his family today that we can defer the further workup for his microscopic hematuria given his age.  Patient would like to continue with testing.  He has no changes to his health over the last year.  LUTS remain well-controlled on medication.  He has refills.    PROCEDURE:       1. Flexible cystourethroscopy    DATE OF PROCEDURE: 6/27/2025     PRE-PROCEDURE DIAGNOSIS: Micro hematuria    POST-PROCEDURE DIAGNOSIS: Same     SURGEON: Fuad Agudelo MD    FINDINGS:    Urethra: Orthotopic meatus, normal caliber urethra throughout without lesions    Prostate: Moderately enlarged prostate with hypervascular lateral lobes, moderate intravesical protrusion of median lobe     Bladder: Normal mucosa with no papillary lesions or erythema, moderate trabeculation with posterior small diverticuli     Ureteral orifices:  Orthotopic    Other findings: None    PROCEDURE:   Patient was brought to the procedure suite and a time-out was performed identifiying the patient,  and procedure to be performed. The risks and benefits of the procedure were once again discussed with the patient including bleeding, infection, and dysuria. The patient agreed to proceed. The patient did not have any signs or symptoms of active UTI.    He was placed in supine position on the table and the penis was prepped and draped in the standard sterile fashion. Urojet was instilled per urethra for local anesthetic effect. A flexible cystoscope was inserted per urethra. The bladder was fully inspected (including retroflexion) and showed findings as above. Both ureteral orifices were orthotopic. The prostate was carefully viewed on removal of the scope, with findings as above. The scope was then carefully removed.    There were no complications and the patient tolerated the procedure well.    IMPRESSION AND PLAN:     Microhematuria  History as above.  Cystoscopy negative for any masses.  Discussed with patient and daughter that ultrasound was ordered last year.  Patient and family will hold off on further imaging for now.  They will let me know if they change their mind.  I think this is reasonable given his age.  He will return in 1 year.         Fuad Agudelo MD  Staff Urologist  [facility]  Office: [phone]

## 2025-07-19 DIAGNOSIS — N40.1 BPH WITH OBSTRUCTION/LOWER URINARY TRACT SYMPTOMS: ICD-10-CM

## 2025-07-19 DIAGNOSIS — N13.8 BPH WITH OBSTRUCTION/LOWER URINARY TRACT SYMPTOMS: ICD-10-CM

## 2025-07-21 RX ORDER — TAMSULOSIN HYDROCHLORIDE 0.4 MG/1
0.4 CAPSULE ORAL DAILY
Qty: 90 CAPSULE | Refills: 3 | Status: SHIPPED | OUTPATIENT
Start: 2025-07-21

## 2025-07-21 NOTE — TELEPHONE ENCOUNTER
This encounter is now closed.     RN approved the refill request of Tamsulosin  Last seen on 6/27/25 with Dr Agudelo

## 2025-08-08 RX ORDER — FINASTERIDE 5 MG/1
5 TABLET, FILM COATED ORAL DAILY
Qty: 90 TABLET | Refills: 3 | Status: SHIPPED | OUTPATIENT
Start: 2025-08-08

## (undated) DIAGNOSIS — N13.8 BPH WITH OBSTRUCTION/LOWER URINARY TRACT SYMPTOMS: Primary | ICD-10-CM

## (undated) DIAGNOSIS — N40.1 BPH WITH OBSTRUCTION/LOWER URINARY TRACT SYMPTOMS: Primary | ICD-10-CM